# Patient Record
Sex: MALE | Race: WHITE | NOT HISPANIC OR LATINO | Employment: FULL TIME | URBAN - METROPOLITAN AREA
[De-identification: names, ages, dates, MRNs, and addresses within clinical notes are randomized per-mention and may not be internally consistent; named-entity substitution may affect disease eponyms.]

---

## 2024-09-26 ENCOUNTER — HOSPITAL ENCOUNTER (EMERGENCY)
Facility: CLINIC | Age: 44
Discharge: HOME | End: 2024-09-26

## 2024-09-26 VITALS
RESPIRATION RATE: 24 BRPM | BODY MASS INDEX: 28.71 KG/M2 | HEIGHT: 72 IN | SYSTOLIC BLOOD PRESSURE: 135 MMHG | OXYGEN SATURATION: 97 % | DIASTOLIC BLOOD PRESSURE: 85 MMHG | WEIGHT: 212 LBS | TEMPERATURE: 98.1 F | HEART RATE: 81 BPM

## 2024-09-26 DIAGNOSIS — R05.1 ACUTE COUGH: ICD-10-CM

## 2024-09-26 DIAGNOSIS — R09.81 NASAL CONGESTION: ICD-10-CM

## 2024-09-26 DIAGNOSIS — M79.10 MYALGIA: ICD-10-CM

## 2024-09-26 LAB
FLUAV RNA SPEC QL NAA+PROBE: NEGATIVE
FLUBV RNA RESP QL NAA+PROBE: NEGATIVE
RSV RNA SPEC NAA+PROBE: NEGATIVE
SARS-COV-2 RNA RESP QL NAA+PROBE: NEGATIVE

## 2024-09-26 PROCEDURE — 87637 SARSCOV2&INF A&B&RSV AMP PRB: CPT | Performed by: EMERGENCY MEDICINE

## 2024-09-26 PROCEDURE — 99283 EMERGENCY DEPT VISIT LOW MDM: CPT

## 2024-09-26 ASSESSMENT — COLUMBIA-SUICIDE SEVERITY RATING SCALE - C-SSRS
1. IN THE PAST MONTH, HAVE YOU WISHED YOU WERE DEAD OR WISHED YOU COULD GO TO SLEEP AND NOT WAKE UP?: NO
2. HAVE YOU ACTUALLY HAD ANY THOUGHTS OF KILLING YOURSELF IN THE PAST MONTH?: NO
6. HAVE YOU EVER DONE ANYTHING, STARTED TO DO ANYTHING, OR PREPARED TO DO ANYTHING TO END YOUR LIFE?: NO

## 2024-09-26 NOTE — ED PROVIDER NOTES
EMERGENCY DEPARTMENT ENCOUNTER      NAME: Jhonny Fu  AGE: 44 year old male  YOB: 1980  MRN: [id number]  EVALUATION DATE & TIME: No admission date for patient encounter.    PCP: No primary care provider on file.    ED PROVIDER: Dani Constantino MD      Chief Complaint   Patient presents with    Fever         FINAL IMPRESSION:  1. Acute cough    2. Myalgia    3. Nasal congestion          ED COURSE & MEDICAL DECISION MAKING:    Pertinent Labs & Imaging studies reviewed. (See chart for details)  44 year old male presents to the Emergency Department for evaluation of fever, cough, nasal congestion.  Differential diagnosis considered URI, sepsis, pneumonia, pneumothorax, ACS, PE.     ED Course as of 09/26/24 1134   Thu Sep 26, 2024   1003 I met the patient and performed my initial interview and exam.   1008 Patient presenting with bodyaches, chills, nasal congestion and mild cough since yesterday.  No known sick contacts however has been working 14 to 16 hours a day getting little sleep for the past 6 weeks.  Denies skin changes, neck stiffness, difficulty swallowing, increased work of breathing or shortness of breath.  Denies chest pain.  Patient is nontoxic-appearing and has reassuring vital signs however does have signs of an upper respiratory illness which is likely viral in etiology.  Did obtain a viral swab and which is pending currently.  Discussed chest x-ray and offered chest x-ray however patient declines.  Discussed that we will not be prescribing antibiotics as it is likely an upper respiratory illness that is likely viral in nature.  Low concern for sepsis.  Low concern for PE, aortic dissection or ACS.  Will not obtain labs or imaging at patient's request.  Likewise he has no chest pain or anginal equivalent he will not obtain an EKG.  Went over strict return precautions as well as Tylenol and ibuprofen for symptomatic relief.  Believe patient can be safely discharged after viral test  result.  If he does have influenza we did discuss Tamiflu and will prescribe as he is less than 48 hours and does have some risk factors given age and smoking history.   Viral testing resulted was negative.  Will not prescribe Tamiflu.  Still most consistent with viral URI.  Patient remained well-appearing and nontoxic.  And had stable vital signs prior to discharge.  Will discharge home with expectant management.    Not Applicable    I discussed the plan for discharge with the patient, and patient is agreeable. We discussed supportive cares at home and reasons for return to the ER including new or worsening symptoms - all questions and concerns addressed to the best of my ability. Strict return precautions discussed. Patient to be discharged by RN.    At the conclusion of the encounter I discussed the results of all of the tests and the disposition. The questions were answered. The patient or family acknowledged understanding and was agreeable with the care plan.     MEDICATIONS GIVEN IN THE EMERGENCY:  Medications - No data to display    NEW PRESCRIPTIONS STARTED AT TODAY'S ER VISIT  There are no discharge medications for this patient.    There are no discharge medications for this patient.      =================================================================    HPI    Patient information was obtained from: patient    Use of : N/A       Jhonny Fu is a 44 year old male, otherwise healthy, who presents to this ED for evaluation of fever.    On 25-Sep-2024, the patient developed body aches and felt more fatigued, with a productive cough, wheezing, and rhinorrhea. He woke on 26-Sep-2024 with diaphoresis and thinks he had a fever overnight. He still has his symptoms, and some throat pain too.    The patient is from [place] and is in [place] for 6 weeks now for work. He has been working 15 hours per day.    The patient has no medical issues, takes no medications, and is otherwise healthy. The patient  denies otalgia.    He smokes cigarettes.      PHYSICAL EXAM    /85   Pulse 81   Temp 98.1  F (36.7  C) (Oral)   Resp 24   Ht 1.829 m (6')   Wt 96.2 kg (212 lb)   SpO2 97%   BMI 28.75 kg/m    Constitutional: Well developed, well nourished. Comfortable appearing.  Head: Normocephalic, atraumatic, mucous membranes moist, nose normal. Posterior oropharynx erythematous, with no exudates. Uvula midline. 2+ tonsillar hypertrophy. Tympanic membranes normal, pearly grey.  Neck: Supple, gross ROM intact.   Eyes: Pupils mid-range, sclera white.  Respiratory: Clear to auscultation bilaterally, no respiratory distress, no wheezing, speaks full sentences easily.  Cardiovascular: Normal heart rate, regular rhythm, no murmurs. No lower extremity edema.   GI: Soft, no tenderness to deep palpation in all quadrants.  Musculoskeletal: Moving all 4 extremities intentionally and without pain. No obvious deformity.  Skin: Warm, dry, no rash.  Neurologic: Alert & oriented x 3, speech clear, moving all extremities spontaneously   Psychiatric: Affect normal, cooperative.     LAB:  All pertinent labs reviewed and interpreted.  Results for orders placed or performed during the hospital encounter of 09/26/24   Symptomatic Influenza A/B, RSV, & SARS-CoV2 PCR (COVID-19) Nasopharyngeal    Specimen: Nasopharyngeal; Swab   Result Value Ref Range    Influenza A PCR Negative Negative    Influenza B PCR Negative Negative    RSV PCR Negative Negative    SARS CoV2 PCR Negative Negative       RADIOLOGY:  Reviewed all pertinent imaging. Please see official radiology report.  No orders to display     Dani Constantino MD  [facility] EMERGENCY ROOM  [address] [place] [address] [phone]   =================================================================    BILLING:  Data  Category 1  Non-ED record review, if applicable. External record reviewed: N/A     Clinical information was obtained from an  independent historian. History was obtained from: Patient     The following testing was considered but ultimately not selected after discussion with patient/family:  Consider chest x-ray as above however it was declined by the patient.  Considered CBC, BMP and lactic however patient did not have two SIRS criteria.     Category 2  My independent interpretation of EKG, rhythm strip, radiology study: N/A     Category 3  Discussion of management with other physician/healthcare provider/other source: N/A       Risk  Prescription medication was considered, but ultimately not given after discussion with patient/family: N/A     Chronic conditions affecting care: N/A     Care significantly affected by Social Determinants of Health: N/A     Consideration of Admission/Observation: N/A       [facility] Documentation:   CMS Diagnoses:               I, Carlton Bryant, am serving as a scribe to document services personally performed byDani Constantino MD based on my observation and the provider's statements to me. I, Dani Constantino MD, attest that Carlton Bryant is acting in a scribe capacity, has observed my performance of the services and has documented them in accordance with my direction.     Dani Constantino MD  09/26/24 4823

## 2024-09-26 NOTE — ED TRIAGE NOTES
"Pt with chills and body aches since yesterday with nasal congestion and cough. No known sick contacts. Took dayquil cold and flu medication this morning. Has not measured his temperature but woke up in a sweat.     Triage Assessment (Adult)       Row Name 09/26/24 0941          Triage Assessment    Airway WDL WDL        Respiratory WDL    Respiratory WDL X;rhythm/pattern;cough  nasal congestion     Rhythm/Pattern, Respiratory tachypneic;other (see comments)  feels a little \"wheezy\"     Cough Frequency infrequent     Cough Type productive        Skin Circulation/Temperature WDL    Skin Circulation/Temperature WDL WDL        Cardiac WDL    Cardiac WDL WDL        Peripheral/Neurovascular WDL    Peripheral Neurovascular WDL WDL        Cognitive/Neuro/Behavioral WDL    Cognitive/Neuro/Behavioral WDL WDL                     "

## 2024-09-26 NOTE — DISCHARGE INSTRUCTIONS
You were evaluated in the Emergency Department today for your congestion, cough and fevers. Your evaluation suggests that your symptoms are most likely due to a viral illness, which will improve on its own with rest and fluids.    You can take over the counter medicines for symptoms relief however, these will not completely relieve your symptoms. Please discuss with a pharmacist which are the most effective. You can also try spoonfuls of honey for cough relief.    Please schedule an appointment for follow up with your primary care physician this week.    Return to the Emergency Department if you experience worsening cough, fever 100.4   F or greater not controlled by Tylenol or Ibuprofen, recurrent vomiting, chest pain, shortness of breath, or any other concerning symptoms.    Thank you for choosing us for your care.

## 2025-08-26 ENCOUNTER — OFFICE VISIT (OUTPATIENT)
Dept: URGENT CARE | Facility: URGENT CARE | Age: 45
End: 2025-08-26

## 2025-08-26 VITALS
BODY MASS INDEX: 31.15 KG/M2 | HEIGHT: 72 IN | SYSTOLIC BLOOD PRESSURE: 138 MMHG | TEMPERATURE: 98 F | WEIGHT: 230 LBS | DIASTOLIC BLOOD PRESSURE: 65 MMHG | RESPIRATION RATE: 18 BRPM | HEART RATE: 77 BPM | OXYGEN SATURATION: 97 %

## 2025-08-26 DIAGNOSIS — B02.9 HERPES ZOSTER WITHOUT COMPLICATION: Primary | ICD-10-CM

## 2025-08-26 PROCEDURE — 99204 OFFICE O/P NEW MOD 45 MIN: CPT | Performed by: PHYSICIAN ASSISTANT

## 2025-08-26 RX ORDER — VALACYCLOVIR HYDROCHLORIDE 1 G/1
1000 TABLET, FILM COATED ORAL 3 TIMES DAILY
Qty: 21 TABLET | Refills: 0 | Status: SHIPPED | OUTPATIENT
Start: 2025-08-26 | End: 2025-09-02